# Patient Record
Sex: FEMALE | Race: WHITE | NOT HISPANIC OR LATINO | URBAN - METROPOLITAN AREA
[De-identification: names, ages, dates, MRNs, and addresses within clinical notes are randomized per-mention and may not be internally consistent; named-entity substitution may affect disease eponyms.]

---

## 2017-01-17 ENCOUNTER — EMERGENCY (EMERGENCY)
Facility: HOSPITAL | Age: 22
LOS: 1 days | Discharge: HOME | End: 2017-01-17
Attending: EMERGENCY MEDICINE | Admitting: EMERGENCY MEDICINE
Payer: COMMERCIAL

## 2017-01-17 VITALS
RESPIRATION RATE: 19 BRPM | OXYGEN SATURATION: 98 % | TEMPERATURE: 97 F | SYSTOLIC BLOOD PRESSURE: 124 MMHG | DIASTOLIC BLOOD PRESSURE: 56 MMHG | HEART RATE: 65 BPM

## 2017-01-17 PROBLEM — Z00.00 ENCOUNTER FOR PREVENTIVE HEALTH EXAMINATION: Status: ACTIVE | Noted: 2017-01-17

## 2017-01-17 LAB
BASOPHILS # BLD AUTO: 0.02 K/UL — SIGNIFICANT CHANGE UP (ref 0–0.2)
BASOPHILS NFR BLD AUTO: 0.1 % — SIGNIFICANT CHANGE UP (ref 0–2)
EOSINOPHIL # BLD AUTO: 0.04 K/UL — SIGNIFICANT CHANGE UP (ref 0–0.5)
EOSINOPHIL NFR BLD AUTO: 0.3 % — SIGNIFICANT CHANGE UP (ref 0–6)
HCT VFR BLD CALC: 36.1 % — SIGNIFICANT CHANGE UP (ref 34.5–45)
HGB BLD-MCNC: 11.9 G/DL — SIGNIFICANT CHANGE UP (ref 11.5–15.5)
IMM GRANULOCYTES NFR BLD AUTO: 0.3 % — SIGNIFICANT CHANGE UP (ref 0–1.5)
LYMPHOCYTES # BLD AUTO: 18.6 % — SIGNIFICANT CHANGE UP (ref 13–44)
LYMPHOCYTES # BLD AUTO: 2.56 K/UL — SIGNIFICANT CHANGE UP (ref 1–3.3)
MCHC RBC-ENTMCNC: 28.1 PG — SIGNIFICANT CHANGE UP (ref 27–34)
MCHC RBC-ENTMCNC: 33 % — SIGNIFICANT CHANGE UP (ref 32–36)
MCV RBC AUTO: 85.3 FL — SIGNIFICANT CHANGE UP (ref 80–100)
MONOCYTES # BLD AUTO: 0.47 K/UL — SIGNIFICANT CHANGE UP (ref 0–0.9)
MONOCYTES NFR BLD AUTO: 3.4 % — SIGNIFICANT CHANGE UP (ref 2–14)
NEUTROPHILS # BLD AUTO: 10.63 K/UL — HIGH (ref 1.8–7.4)
NEUTROPHILS NFR BLD AUTO: 77.3 % — HIGH (ref 43–77)
PLATELET # BLD AUTO: 342 K/UL — SIGNIFICANT CHANGE UP (ref 150–400)
PMV BLD: 9.7 FL — SIGNIFICANT CHANGE UP (ref 7–13)
RBC # BLD: 4.23 M/UL — SIGNIFICANT CHANGE UP (ref 3.8–5.2)
RBC # FLD: 12 % — SIGNIFICANT CHANGE UP (ref 10.3–14.5)
WBC # BLD: 13.76 K/UL — HIGH (ref 3.8–10.5)
WBC # FLD AUTO: 13.76 K/UL — HIGH (ref 3.8–10.5)

## 2017-01-17 PROCEDURE — 71010: CPT | Mod: 26

## 2017-01-17 PROCEDURE — 99234 HOSP IP/OBS SM DT SF/LOW 45: CPT

## 2017-01-17 PROCEDURE — 93010 ELECTROCARDIOGRAM REPORT: CPT | Mod: 59

## 2017-01-17 RX ORDER — SODIUM CHLORIDE 9 MG/ML
1000 INJECTION INTRAMUSCULAR; INTRAVENOUS; SUBCUTANEOUS ONCE
Qty: 0 | Refills: 0 | Status: COMPLETED | OUTPATIENT
Start: 2017-01-17 | End: 2017-01-17

## 2017-01-17 RX ADMIN — SODIUM CHLORIDE 2000 MILLILITER(S): 9 INJECTION INTRAMUSCULAR; INTRAVENOUS; SUBCUTANEOUS at 23:44

## 2017-01-17 NOTE — ED PROVIDER NOTE - MEDICAL DECISION MAKING DETAILS
Janis att: 22 yo woman presenting with syncope in the presence of chest pain. Janis att: 22 yo woman presenting with syncope in the presence of chest pain.  Chest pain concerning for possible arrythmia, EKG although not classic raises concern for HOCM.  LVH may be do to athletisism.  No e/o brugada, long or short QT, or accessory pathway.  No risk factors or findings for PE, PERC negative.  CDU for observation and echo.

## 2017-01-17 NOTE — ED ADULT TRIAGE NOTE - CHIEF COMPLAINT QUOTE
pt says she passed out at work an hour ago. 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 Has a small abrasion on her face. c/o head feels heavy now, denies any palpitation or CP or SOB or dizziness now.   PMH : Knee surgery few years ago . pt says she passed out at work an hour ago. 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 . Has a small abrasion on her face. c/o head feels heavy now, denies any palpitation or CP or SOB or dizziness now.      PMH : Knee surgery few years ago .

## 2017-01-17 NOTE — ED PROVIDER NOTE - OBJECTIVE STATEMENT
22 yo woman presenting with episode of passing out.  Was at work, felt abrupt onset of palpitations and chest pressure, nonradiaitng, nonexertional, onset at rest, not better or worse with anything.   Patient states she then felt dizzy and lost consciousness.  States she hit her head on the way down.  Reports no chest pain, dyspnea, or dizziness currently.  No family history of sudden death.

## 2017-01-17 NOTE — ED PROVIDER NOTE - PHYSICAL EXAMINATION
Janis att: General: Well appearing, nontoxic, no acute distress; Head: Normocephalic, stuble abrasion/bruise at left maxilla, maxilla and mandible nontender; Eyes: PERRL, EOMI; ENT: Airway patent; Neck: supple; Chest: Lungs clear to auscultation bilateral; Cardiac: Regular rate and rhythm, no murmurs, rubs or gallops; Abdomen: soft, nontender, nondistended; no guarding or rebound; Musculoskeletal: Calves symmetric, nontender, no palpable cord; Skin: No rash, normal skin tone; Neuro: Alert and Oriented to person, place, and time; No focal deficit, CN 2-12 symmetric and intact

## 2017-01-18 VITALS
HEART RATE: 72 BPM | RESPIRATION RATE: 16 BRPM | SYSTOLIC BLOOD PRESSURE: 105 MMHG | OXYGEN SATURATION: 100 % | DIASTOLIC BLOOD PRESSURE: 73 MMHG | TEMPERATURE: 97 F

## 2017-01-18 LAB
ALBUMIN SERPL ELPH-MCNC: 4.6 G/DL — SIGNIFICANT CHANGE UP (ref 3.3–5)
ALP SERPL-CCNC: 84 U/L — SIGNIFICANT CHANGE UP (ref 40–120)
ALT FLD-CCNC: 18 U/L — SIGNIFICANT CHANGE UP (ref 4–33)
APPEARANCE UR: CLEAR — SIGNIFICANT CHANGE UP
AST SERPL-CCNC: 21 U/L — SIGNIFICANT CHANGE UP (ref 4–32)
BACTERIA # UR AUTO: SIGNIFICANT CHANGE UP
BILIRUB SERPL-MCNC: 0.4 MG/DL — SIGNIFICANT CHANGE UP (ref 0.2–1.2)
BILIRUB UR-MCNC: NEGATIVE — SIGNIFICANT CHANGE UP
BLOOD UR QL VISUAL: NEGATIVE — SIGNIFICANT CHANGE UP
BUN SERPL-MCNC: 17 MG/DL — SIGNIFICANT CHANGE UP (ref 7–23)
CALCIUM SERPL-MCNC: 9.6 MG/DL — SIGNIFICANT CHANGE UP (ref 8.4–10.5)
CHLORIDE SERPL-SCNC: 101 MMOL/L — SIGNIFICANT CHANGE UP (ref 98–107)
CK MB BLD-MCNC: 1 NG/ML — SIGNIFICANT CHANGE UP (ref 1–4.7)
CK MB BLD-MCNC: SIGNIFICANT CHANGE UP (ref 0–2.5)
CK SERPL-CCNC: 35 U/L — SIGNIFICANT CHANGE UP (ref 25–170)
CO2 SERPL-SCNC: 24 MMOL/L — SIGNIFICANT CHANGE UP (ref 22–31)
COLOR SPEC: SIGNIFICANT CHANGE UP
CREAT SERPL-MCNC: 0.67 MG/DL — SIGNIFICANT CHANGE UP (ref 0.5–1.3)
GLUCOSE SERPL-MCNC: 78 MG/DL — SIGNIFICANT CHANGE UP (ref 70–99)
GLUCOSE UR-MCNC: NEGATIVE — SIGNIFICANT CHANGE UP
HBA1C BLD-MCNC: 5.4 % — SIGNIFICANT CHANGE UP (ref 4–5.6)
HCG SERPL-ACNC: < 5 MIU/ML — SIGNIFICANT CHANGE UP
HCT VFR BLD CALC: 33.2 % — LOW (ref 34.5–45)
HGB BLD-MCNC: 10.9 G/DL — LOW (ref 11.5–15.5)
KETONES UR-MCNC: NEGATIVE — SIGNIFICANT CHANGE UP
LEUKOCYTE ESTERASE UR-ACNC: NEGATIVE — SIGNIFICANT CHANGE UP
MCHC RBC-ENTMCNC: 28.2 PG — SIGNIFICANT CHANGE UP (ref 27–34)
MCHC RBC-ENTMCNC: 32.8 % — SIGNIFICANT CHANGE UP (ref 32–36)
MCV RBC AUTO: 85.8 FL — SIGNIFICANT CHANGE UP (ref 80–100)
MUCOUS THREADS # UR AUTO: SIGNIFICANT CHANGE UP
NITRITE UR-MCNC: NEGATIVE — SIGNIFICANT CHANGE UP
PH UR: 6 — SIGNIFICANT CHANGE UP (ref 4.6–8)
PLATELET # BLD AUTO: 302 K/UL — SIGNIFICANT CHANGE UP (ref 150–400)
PMV BLD: 9.8 FL — SIGNIFICANT CHANGE UP (ref 7–13)
POTASSIUM SERPL-MCNC: 4.1 MMOL/L — SIGNIFICANT CHANGE UP (ref 3.5–5.3)
POTASSIUM SERPL-SCNC: 4.1 MMOL/L — SIGNIFICANT CHANGE UP (ref 3.5–5.3)
PROT SERPL-MCNC: 7.1 G/DL — SIGNIFICANT CHANGE UP (ref 6–8.3)
PROT UR-MCNC: NEGATIVE — SIGNIFICANT CHANGE UP
RBC # BLD: 3.87 M/UL — SIGNIFICANT CHANGE UP (ref 3.8–5.2)
RBC # FLD: 12 % — SIGNIFICANT CHANGE UP (ref 10.3–14.5)
RBC CASTS # UR COMP ASSIST: SIGNIFICANT CHANGE UP (ref 0–?)
SODIUM SERPL-SCNC: 138 MMOL/L — SIGNIFICANT CHANGE UP (ref 135–145)
SP GR SPEC: 1.01 — SIGNIFICANT CHANGE UP (ref 1–1.03)
SQUAMOUS # UR AUTO: SIGNIFICANT CHANGE UP
TROPONIN T SERPL-MCNC: < 0.06 NG/ML — SIGNIFICANT CHANGE UP (ref 0–0.06)
TROPONIN T SERPL-MCNC: < 0.06 NG/ML — SIGNIFICANT CHANGE UP (ref 0–0.06)
UROBILINOGEN FLD QL: NORMAL E.U. — SIGNIFICANT CHANGE UP (ref 0.1–0.2)
WBC # BLD: 10.59 K/UL — HIGH (ref 3.8–10.5)
WBC # FLD AUTO: 10.59 K/UL — HIGH (ref 3.8–10.5)
WBC CLUMPS #/AREA URNS HPF: PRESENT — HIGH (ref 0–?)
WBC UR QL: SIGNIFICANT CHANGE UP (ref 0–?)

## 2017-01-18 PROCEDURE — 93306 TTE W/DOPPLER COMPLETE: CPT | Mod: 26

## 2017-01-18 PROCEDURE — 70450 CT HEAD/BRAIN W/O DYE: CPT | Mod: 26

## 2017-01-18 RX ORDER — SODIUM CHLORIDE 9 MG/ML
1000 INJECTION INTRAMUSCULAR; INTRAVENOUS; SUBCUTANEOUS ONCE
Qty: 0 | Refills: 0 | Status: COMPLETED | OUTPATIENT
Start: 2017-01-18 | End: 2017-01-18

## 2017-01-18 RX ADMIN — SODIUM CHLORIDE 1000 MILLILITER(S): 9 INJECTION INTRAMUSCULAR; INTRAVENOUS; SUBCUTANEOUS at 02:57

## 2017-01-18 NOTE — ED CDU PROVIDER NOTE - OBJECTIVE STATEMENT
22 yo F with no pmhx here with complaint of syncope. Patient came to ED yesterday after having syncopal event at work. 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  She went to sit down on the floor and syncopized for less than 1 min as per office staff. Patient states she struck her L cheek on the cabinet when she sat on the floor and sustained a small abrasion.  Patient states she remembers the event. Otherwise has been feeling well. Ate and drank earlier in the day. Three years ago patient states she had a similar incident in which she was walking from using the bathroom and "passed out" she was brought to an ER for eval and was told it was because her BP dropped. No other incidents of the same. denies fhx of cardiac issues. Mom reports when she was younger she passed out as well. denies personal hx of cardiac issues or murmurs or ekg abnormalities. Denies recent fever, HA, sob, CP, abdominal pain, difficulty breathing, vision changes.

## 2017-01-18 NOTE — ED CDU PROVIDER NOTE - NEUROLOGICAL, MLM
Alert and oriented, no focal deficits, no motor or sensory deficits. EOMi, point to point intact, visual fields intact, strength 5/5 UE and LE bilaterally.

## 2017-01-18 NOTE — ED CDU PROVIDER NOTE - PHYSICAL EXAMINATION
SKIN: L cheek with superficial 1.5 cm abrasion, no evidence of infection, no discharge/drainage/bleeding, non tender, no repairable laceration SKIN: L cheek with superficial 1.5 cm abrasion, no evidence of infection, no discharge/drainage/bleeding, non tender, no repairable laceration  Fink att: General: Well appearing, nontoxic, no acute distress; Head: Normocephalic abrasion left maxilla; Eyes: PERRL, EOMI; ENT: Airway patent; Neck: supple; Chest: Lungs clear to auscultation bilateral; Cardiac: Regular rate and rhythm, no murmurs, rubs or gallops; Abdomen: soft, nontender, nondistended; no guarding or rebound; Musculoskeletal: Calves symmetric, nontender, no palpable cord; Skin: No rash, normal skin tone; Neuro: Alert and Oriented to person, place, and time; No focal deficit, CN 2-12 symmetric and intact

## 2017-01-18 NOTE — ED ADULT NURSE NOTE - CHIEF COMPLAINT QUOTE
pt says she passed out at work an hour ago. 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 . Has a small abrasion on her face. c/o head feels heavy now, denies any palpitation or CP or SOB or dizziness now.      PMH : Knee surgery few years ago .

## 2017-01-18 NOTE — ED CDU PROVIDER NOTE - ATTENDING CONTRIBUTION TO CARE
I performed a face to face evaluation of this patient and obtained a history and performed a full exam.  I agree with the history, physical exam and plan of the PA.

## 2017-01-18 NOTE — ED CDU PROVIDER NOTE - PLAN OF CARE
Rest, drink plenty of fluids.  Advance activity as tolerated.  Continue all previously prescribed medications as directed.  Follow up with your primary physician Dr. Jeniffer Acosta and cardiology (referral list provided) in 48-72 hours- bring copies of your results.  Return to the ER for worsening or persistent symptoms, including worsening/persistent chest pain, shortness of breath, passing out, ANY NEW OR CONCERNING SYMPTOMS. If you have issues obtaining follow up, please call: [phone] to obtain a doctor or specialist who takes your insurance in your area.

## 2017-01-18 NOTE — ED CDU PROVIDER NOTE - MEDICAL DECISION MAKING DETAILS
22 yo F with no pmhx here with syncopal event. Pt seen in ED with normal exam, found to have EKG with LVH and q waves, concern for HOCM. Denies fhx of sudden death or heart disease. Otherwise well.  CT negative, CE x 1 neg, second pending, CXR wnl.  PLAN: CE, Labs, Echo in AM, Tele monitor 20 yo F with no pmhx here with syncopal event. Pt seen in ED with normal exam, found to have EKG with LVH and q waves, concern for HOCM. Denies fhx of sudden death or heart disease. Otherwise well.  CT negative, CE x 1 neg, second pending, CXR wnl.  PLAN: CE, Labs, Echo in AM, Tele monitor  Fink att: 20 yo with syncope.  CDU for cardiac monitor and echo.

## 2017-01-18 NOTE — ED CDU PROVIDER NOTE - PROGRESS NOTE DETAILS
CDU TREVOR Cantu Progress Note:  Pt endorses feeling well.  Pt does not have any acute complaints.  Awaiting Echo results.  Discussed case with pt's pediatrician Dr. Jeniffer Acosta who agrees with outpatient follow up in office and referral to cardiology for possible further testing if no acute pathology noted on echocardiogram.  Awaiting echocardiogram results at this time. CDU TREVOR Cantu Discharge Note:  Pt endorses feeling well without any acute complaints.  Echo with EF 65% and minimal aortic regurgitation.  Pt medically stable for discharge.  Pt to follow up with Dr. Acosta (pediatrician) and cardiology (referral list provided). CDU Attending Dr. Rowe Discharge Note: I took over care of this patient at 7 AM on 1/18/19.  The patient is a 20 yo female sent to CDU for echo due to syncopal episode while working.  No CP/SOB, N/V/D or abdominal pain.  On my evaluation this morning pt noted feeling improved.  On exam pt well appearing, in NAD, heart RRR, lungs CTAB, abd NTND, extremities without swelling, strength 5/5 in all extremities and skin without rash.  Echo showing no acute findings and pt is stable for discharge home with outpatient follow up.

## 2021-07-06 ENCOUNTER — OUTPATIENT (OUTPATIENT)
Dept: OUTPATIENT SERVICES | Facility: HOSPITAL | Age: 26
LOS: 1 days | End: 2021-07-06

## 2021-07-06 VITALS
SYSTOLIC BLOOD PRESSURE: 98 MMHG | HEART RATE: 73 BPM | DIASTOLIC BLOOD PRESSURE: 60 MMHG | HEIGHT: 69.5 IN | WEIGHT: 156.09 LBS | TEMPERATURE: 98 F | OXYGEN SATURATION: 99 % | RESPIRATION RATE: 16 BRPM

## 2021-07-06 DIAGNOSIS — M26.12 OTHER JAW ASYMMETRY: ICD-10-CM

## 2021-07-06 DIAGNOSIS — Z98.890 OTHER SPECIFIED POSTPROCEDURAL STATES: Chronic | ICD-10-CM

## 2021-07-06 DIAGNOSIS — M26.03 MANDIBULAR HYPERPLASIA: ICD-10-CM

## 2021-07-06 DIAGNOSIS — Z01.812 ENCOUNTER FOR PREPROCEDURAL LABORATORY EXAMINATION: ICD-10-CM

## 2021-07-06 LAB
BLD GP AB SCN SERPL QL: NEGATIVE — SIGNIFICANT CHANGE UP
HCG UR QL: NEGATIVE — SIGNIFICANT CHANGE UP
HCT VFR BLD CALC: 35.5 % — SIGNIFICANT CHANGE UP (ref 34.5–45)
HGB BLD-MCNC: 11.5 G/DL — SIGNIFICANT CHANGE UP (ref 11.5–15.5)
MCHC RBC-ENTMCNC: 27.7 PG — SIGNIFICANT CHANGE UP (ref 27–34)
MCHC RBC-ENTMCNC: 32.4 GM/DL — SIGNIFICANT CHANGE UP (ref 32–36)
MCV RBC AUTO: 85.5 FL — SIGNIFICANT CHANGE UP (ref 80–100)
NRBC # BLD: 0 /100 WBCS — SIGNIFICANT CHANGE UP
NRBC # FLD: 0 K/UL — SIGNIFICANT CHANGE UP
PLATELET # BLD AUTO: 391 K/UL — SIGNIFICANT CHANGE UP (ref 150–400)
RBC # BLD: 4.15 M/UL — SIGNIFICANT CHANGE UP (ref 3.8–5.2)
RBC # FLD: 12.7 % — SIGNIFICANT CHANGE UP (ref 10.3–14.5)
RH IG SCN BLD-IMP: POSITIVE — SIGNIFICANT CHANGE UP
WBC # BLD: 5.71 K/UL — SIGNIFICANT CHANGE UP (ref 3.8–10.5)
WBC # FLD AUTO: 5.71 K/UL — SIGNIFICANT CHANGE UP (ref 3.8–10.5)

## 2021-07-06 NOTE — H&P PST ADULT - ENMT COMMENTS
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

## 2021-07-06 NOTE — H&P PST ADULT - HISTORY OF PRESENT ILLNESS
26y/o female presents for preop eval for scheduled maxillary lefort I osteotomy with bone graft.  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

## 2021-07-06 NOTE — H&P PST ADULT - NSICDXFAMILYHX_GEN_ALL_CORE_FT
FAMILY HISTORY:  No pertinent family history in first degree relatives     FAMILY HISTORY:  Family hx of hypertension    Grandparent  Still living? Yes, Estimated age: Age Unknown  FHx: heart disease, Age at diagnosis: Age Unknown

## 2021-07-06 NOTE — H&P PST ADULT - NSICDXPROBLEM_GEN_ALL_CORE_FT
PROBLEM DIAGNOSES  Problem: Encounter for preprocedure screening laboratory testing for COVID-19  Assessment and Plan: completed covid vaccine 3/18/21, pt instructed to bring hard copy, stated, copy was email to surgeon office.  Pt with recent covid exposure on 7/4/21 at a graduation party  Instructed to con    Problem: Other jaw asymmetry  Assessment and Plan: Written & verbal preop instructions, gi prophylaxis   Pt verbalized good understanding.   ucg, abo on admit        PROBLEM DIAGNOSES  Problem: Encounter for preprocedure screening laboratory testing for COVID-19  Assessment and Plan: completed covid vaccine 3/18/21, pt instructed to bring hard copy, stated, copy was email to surgeon office.  Pt with recent covid exposure on 7/4/21 at a graduation party  Instructed to contact  Dr Ac office for further instructions.    Problem: Other jaw asymmetry  Assessment and Plan: Written & verbal preop instructions, gi prophylaxis   Pt verbalized good understanding.   ucg, abo on admit

## 2021-07-06 NOTE — H&P PST ADULT - PRIMARY CARE PROVIDER
Pt AAOx3, resting in bed, side rails up x 2, call bell within reach. NAD at this time. Will continue to monitor.     Dr Zuleima Brown  [phone]

## 2021-07-06 NOTE — H&P PST ADULT - WEIGHT IN KG
September 11, 2017        Michael Valerio MD  [address] - [facility]  [address]  [facility] [address]  Phone: [phone]  Fax: [phone]   Patient: Pablo Zamudio   MR Number: [id number]   YOB: 1996   Date of Visit: 9/11/2017       Dear Dr. Valerio:    Thank you for referring Pablo Zamudio to me for evaluation. Attached you will find relevant portions of my assessment and plan of care.    If you have questions, please do not hesitate to call me. I look forward to following Pablo Zamudio along with you.    Sincerely,      French Ballesteros MD            CC  No Recipients    Enclosure         
70.8

## 2021-07-12 ENCOUNTER — TRANSCRIPTION ENCOUNTER (OUTPATIENT)
Age: 26
End: 2021-07-12

## 2021-07-13 ENCOUNTER — INPATIENT (INPATIENT)
Facility: HOSPITAL | Age: 26
LOS: 0 days | Discharge: HOME | End: 2021-07-14
Attending: ORAL & MAXILLOFACIAL SURGERY | Admitting: ORAL & MAXILLOFACIAL SURGERY

## 2021-07-13 VITALS
HEIGHT: 69.5 IN | OXYGEN SATURATION: 98 % | RESPIRATION RATE: 18 BRPM | DIASTOLIC BLOOD PRESSURE: 60 MMHG | TEMPERATURE: 98 F | WEIGHT: 156.09 LBS | HEART RATE: 65 BPM | SYSTOLIC BLOOD PRESSURE: 105 MMHG

## 2021-07-13 DIAGNOSIS — Z98.890 OTHER SPECIFIED POSTPROCEDURAL STATES: Chronic | ICD-10-CM

## 2021-07-13 DIAGNOSIS — M26.03 MANDIBULAR HYPERPLASIA: ICD-10-CM

## 2021-07-13 LAB — HCG UR QL: NEGATIVE — SIGNIFICANT CHANGE UP

## 2021-07-13 RX ORDER — HYDROMORPHONE HYDROCHLORIDE 2 MG/ML
0.5 INJECTION INTRAMUSCULAR; INTRAVENOUS; SUBCUTANEOUS
Refills: 0 | Status: DISCONTINUED | OUTPATIENT
Start: 2021-07-13 | End: 2021-07-13

## 2021-07-13 RX ORDER — FLUTICASONE PROPIONATE 50 MCG
2 SPRAY, SUSPENSION NASAL ONCE
Refills: 0 | Status: COMPLETED | OUTPATIENT
Start: 2021-07-13 | End: 2021-07-13

## 2021-07-13 RX ORDER — OXYCODONE HYDROCHLORIDE 5 MG/1
5 TABLET ORAL ONCE
Refills: 0 | Status: DISCONTINUED | OUTPATIENT
Start: 2021-07-13 | End: 2021-07-13

## 2021-07-13 RX ORDER — ONDANSETRON 8 MG/1
4 TABLET, FILM COATED ORAL EVERY 8 HOURS
Refills: 0 | Status: DISCONTINUED | OUTPATIENT
Start: 2021-07-13 | End: 2021-07-14

## 2021-07-13 RX ORDER — PENICILLIN G POTASSIUM 5000000 [IU]/1
2 POWDER, FOR SOLUTION INTRAMUSCULAR; INTRAPLEURAL; INTRATHECAL; INTRAVENOUS EVERY 4 HOURS
Refills: 0 | Status: DISCONTINUED | OUTPATIENT
Start: 2021-07-13 | End: 2021-07-14

## 2021-07-13 RX ORDER — CHLORHEXIDINE GLUCONATE 213 G/1000ML
15 SOLUTION TOPICAL
Refills: 0 | Status: DISCONTINUED | OUTPATIENT
Start: 2021-07-13 | End: 2021-07-14

## 2021-07-13 RX ORDER — OXYMETAZOLINE HYDROCHLORIDE 0.5 MG/ML
2 SPRAY NASAL
Refills: 0 | Status: DISCONTINUED | OUTPATIENT
Start: 2021-07-13 | End: 2021-07-14

## 2021-07-13 RX ORDER — SODIUM CHLORIDE 0.65 %
2 AEROSOL, SPRAY (ML) NASAL
Refills: 0 | Status: DISCONTINUED | OUTPATIENT
Start: 2021-07-13 | End: 2021-07-14

## 2021-07-13 RX ORDER — IBUPROFEN 200 MG
600 TABLET ORAL EVERY 6 HOURS
Refills: 0 | Status: DISCONTINUED | OUTPATIENT
Start: 2021-07-13 | End: 2021-07-14

## 2021-07-13 RX ORDER — METOCLOPRAMIDE HCL 10 MG
10 TABLET ORAL ONCE
Refills: 0 | Status: COMPLETED | OUTPATIENT
Start: 2021-07-13 | End: 2021-07-13

## 2021-07-13 RX ORDER — OXYCODONE HYDROCHLORIDE 5 MG/1
10 TABLET ORAL EVERY 4 HOURS
Refills: 0 | Status: DISCONTINUED | OUTPATIENT
Start: 2021-07-13 | End: 2021-07-14

## 2021-07-13 RX ORDER — ACETAMINOPHEN 500 MG
650 TABLET ORAL EVERY 6 HOURS
Refills: 0 | Status: DISCONTINUED | OUTPATIENT
Start: 2021-07-13 | End: 2021-07-14

## 2021-07-13 RX ORDER — SODIUM CHLORIDE 9 MG/ML
1000 INJECTION, SOLUTION INTRAVENOUS
Refills: 0 | Status: DISCONTINUED | OUTPATIENT
Start: 2021-07-13 | End: 2021-07-14

## 2021-07-13 RX ORDER — MORPHINE SULFATE 50 MG/1
2 CAPSULE, EXTENDED RELEASE ORAL ONCE
Refills: 0 | Status: DISCONTINUED | OUTPATIENT
Start: 2021-07-13 | End: 2021-07-14

## 2021-07-13 RX ORDER — OXYCODONE HYDROCHLORIDE 5 MG/1
5 TABLET ORAL EVERY 4 HOURS
Refills: 0 | Status: DISCONTINUED | OUTPATIENT
Start: 2021-07-13 | End: 2021-07-14

## 2021-07-13 RX ORDER — HYDROMORPHONE HYDROCHLORIDE 2 MG/ML
0.25 INJECTION INTRAMUSCULAR; INTRAVENOUS; SUBCUTANEOUS
Refills: 0 | Status: DISCONTINUED | OUTPATIENT
Start: 2021-07-13 | End: 2021-07-13

## 2021-07-13 RX ORDER — ONDANSETRON 8 MG/1
4 TABLET, FILM COATED ORAL ONCE
Refills: 0 | Status: COMPLETED | OUTPATIENT
Start: 2021-07-13 | End: 2021-07-13

## 2021-07-13 RX ADMIN — Medication 10 MILLIGRAM(S): at 14:19

## 2021-07-13 RX ADMIN — HYDROMORPHONE HYDROCHLORIDE 0.5 MILLIGRAM(S): 2 INJECTION INTRAMUSCULAR; INTRAVENOUS; SUBCUTANEOUS at 14:05

## 2021-07-13 RX ADMIN — Medication 600 MILLIGRAM(S): at 18:15

## 2021-07-13 RX ADMIN — PENICILLIN G POTASSIUM 100 MILLION UNIT(S): 5000000 POWDER, FOR SOLUTION INTRAMUSCULAR; INTRAPLEURAL; INTRATHECAL; INTRAVENOUS at 14:04

## 2021-07-13 RX ADMIN — Medication 650 MILLIGRAM(S): at 18:59

## 2021-07-13 RX ADMIN — Medication 650 MILLIGRAM(S): at 18:24

## 2021-07-13 RX ADMIN — ONDANSETRON 4 MILLIGRAM(S): 8 TABLET, FILM COATED ORAL at 18:04

## 2021-07-13 RX ADMIN — Medication 10 MILLIGRAM(S): at 21:26

## 2021-07-13 RX ADMIN — PENICILLIN G POTASSIUM 100 MILLION UNIT(S): 5000000 POWDER, FOR SOLUTION INTRAMUSCULAR; INTRAPLEURAL; INTRATHECAL; INTRAVENOUS at 18:04

## 2021-07-13 RX ADMIN — Medication 600 MILLIGRAM(S): at 18:45

## 2021-07-13 RX ADMIN — PENICILLIN G POTASSIUM 100 MILLION UNIT(S): 5000000 POWDER, FOR SOLUTION INTRAMUSCULAR; INTRAPLEURAL; INTRATHECAL; INTRAVENOUS at 22:39

## 2021-07-13 RX ADMIN — CHLORHEXIDINE GLUCONATE 15 MILLILITER(S): 213 SOLUTION TOPICAL at 18:24

## 2021-07-13 RX ADMIN — Medication 2 SPRAY(S): at 16:28

## 2021-07-13 RX ADMIN — HYDROMORPHONE HYDROCHLORIDE 0.5 MILLIGRAM(S): 2 INJECTION INTRAMUSCULAR; INTRAVENOUS; SUBCUTANEOUS at 13:51

## 2021-07-13 NOTE — H&P ADULT - NSICDXFAMILYHX_GEN_ALL_CORE_FT
FAMILY HISTORY:  Family hx of hypertension    Grandparent  Still living? Yes, Estimated age: Age Unknown  FHx: heart disease, Age at diagnosis: Age Unknown

## 2021-07-13 NOTE — H&P ADULT - HISTORY OF PRESENT ILLNESS
24 y/o female presents for scheduled maxillary lefort I osteotomy, bilateral sagittal split osteotomy with bone graft.  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

## 2021-07-13 NOTE — H&P ADULT - ASSESSMENT
24 y/o female presents for scheduled maxillary lefort I osteotomy, bilateral sagittal split osteotomy with bone graft with Dr. Ac in OR.  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

## 2021-07-13 NOTE — H&P ADULT - NSHPPHYSICALEXAM_GEN_ALL_CORE
· Constitutional	no distress  · Eyes	PERRL; EOMI; conjunctiva clear; pupil L; pupil R  · Pupil L	round; brisk  · Pupil Size L	mm, 3  · Pupil R	round; brisk  · Pupil Size R	mm, 3  · ENMT	mouth- moist; pharynx-no redness, no discharge   · Neck	supple; no JVD  · Breasts	not examined  · Back	normal shape; ROM intact  · Respiratory	breath sounds equal; respirations non-labored; clear to auscultation bilaterally  · Cardiovascular	regular rate and rhythm  normal PMI. positive S1; positive S2  · Gastrointestinal	GI Normal-soft; nontender; no distention; no masses palpable; bowel sounds normal  · Genitourinary	not examined  · Rectal	not examined  · Extremities	no clubbing; no cyanosis; no pedal edema  · Vascular	      Carotid Pulse- right normal; left normal. Radial Pulse-- right normal  · Neurological	alert and oriented x 3  · Skin	warm and dry; color normal  · Skin Comments	mobile mass - left wrist  · Lymph Nodes	no lymphadenopathy noted  · Musculoskeletal	Preop dx other jaw asymmetry  · Psychiatric	normal affect; normal behavior

## 2021-07-13 NOTE — H&P ADULT - NSHPREVIEWOFSYSTEMS_GEN_ALL_CORE
· Negative General Symptoms	no fever; no chills; no sweating  · Negative Skin Symptoms	no rash; no itching; no dryness  · Negative Ophthalmologic Symptoms	no diplopia; no photophobia; no blurred vision L; no blurred vision R  · ENMT Symptoms	sinus symptoms  nasal congestion  · ENMT Comments	TMJ- asymmetry of jaw with clicking of jaw  · Negative Respiratory and Thorax Symptoms	no wheezing; no dyspnea; no cough; no hemoptysis; no pleuritic chest pain  · Respiratory and Thorax Comments	recently completed 5 day z yemi  · Negative Cardiovascular Symptoms	no chest pain; no palpitations; no dyspnea on exertion; no orthopnea; no paroxysmal nocturnal dyspnea; no peripheral edema; no claudication  · Negative Gastrointestinal Symptoms	no nausea; no vomiting; no diarrhea; no constipation; no abdominal pain  · Negative General Genitourinary Symptoms	no hematuria; no renal colic; no flank pain L; no flank pain R; no bladder infections; no dysuria; normal urinary frequency  · Musculoskeletal Symptoms	joint pain; left wrist ganglion cyst  · Negative Neurological Symptoms	no syncope; no tremors; no vertigo; no difficulty walking; no headache  · Negative Psychiatric Symptoms	no depression; no anxiety; no insomnia  · Negative Hematology Symptoms	no gum bleeding; no nose bleeding; no skin lumps  · Negative Endocrine Symptoms	no cold intolerance; no heat intolerance  · Allergic/Immunologic	negative

## 2021-07-13 NOTE — PROGRESS NOTE ADULT - SUBJECTIVE AND OBJECTIVE BOX
OMFS POST-OP NOTE  #[id number]     25y female 5 hours s/p Lefort 1 Osteotomy and BSSO. Patient examined at PACU bed. ROSA ELENA since OR. Patient recovering uneventfully. Vitals WNR. Afebrile. A-Line out, NGT out. Voiding into goldberg until pt ready for transfer to floor. Tolerating sips PO water. IV fluids on. Pending ambulation and pending independent voiding. Pain score 3/10. Patient denies fever, chills, vomiting. Reports mild to moderate nausea.       Objective:  Gen: NAD, AAOx3  Pulm: Non-labored breathing   EOE: B/L mild facial edema c/w surgery. Jaw bra in place, Ice in place, Dried heme at nares b/l. Mild b/l V-2 and V-3 hypoesthesia.   IOE: Elastics in place, holding occlusion stable. Sutures clean and intact. Incisions hemostatic. Gingiva pink, and well-perfused.     OBJECTIVE:   T(C): 36.5 (07-13-21 @ 16:30), Max: 36.5 (07-13-21 @ 16:30)  HR: 75 (07-13-21 @ 18:00) (59 - 95)  BP: 122/75 (07-13-21 @ 18:00) (105/60 - 128/62)  RR: 16 (07-13-21 @ 18:00) (12 - 20)  SpO2: 99% (07-13-21 @ 18:00) (96% - 100%)  Wt(kg): --  CAPILLARY BLOOD GLUCOSE        I&O's Detail    13 Jul 2021 07:01  -  13 Jul 2021 18:22  --------------------------------------------------------  IN:    Lactated Ringers: 400 mL    Oral Fluid: 50 mL  Total IN: 450 mL    OUT:    Indwelling Catheter - Urethral (mL): 400 mL    Nasogastric/Oral tube (mL): 150 mL  Total OUT: 550 mL    Total NET: -100 mL

## 2021-07-14 ENCOUNTER — TRANSCRIPTION ENCOUNTER (OUTPATIENT)
Age: 26
End: 2021-07-14

## 2021-07-14 VITALS
RESPIRATION RATE: 17 BRPM | OXYGEN SATURATION: 99 % | SYSTOLIC BLOOD PRESSURE: 117 MMHG | DIASTOLIC BLOOD PRESSURE: 59 MMHG | TEMPERATURE: 99 F | HEART RATE: 77 BPM

## 2021-07-14 RX ORDER — OXYCODONE HYDROCHLORIDE 5 MG/1
5 TABLET ORAL
Qty: 0 | Refills: 0 | DISCHARGE
Start: 2021-07-14

## 2021-07-14 RX ORDER — ACETAMINOPHEN 500 MG
20.31 TABLET ORAL
Qty: 0 | Refills: 0 | DISCHARGE
Start: 2021-07-14

## 2021-07-14 RX ORDER — OXYMETAZOLINE HYDROCHLORIDE 0.5 MG/ML
0 SPRAY NASAL
Qty: 0 | Refills: 0 | DISCHARGE
Start: 2021-07-14 | End: 2021-07-16

## 2021-07-14 RX ORDER — AMOXICILLIN 250 MG/5ML
5 SUSPENSION, RECONSTITUTED, ORAL (ML) ORAL
Qty: 0 | Refills: 0 | DISCHARGE

## 2021-07-14 RX ORDER — SODIUM CHLORIDE 0.65 %
0 AEROSOL, SPRAY (ML) NASAL
Qty: 0 | Refills: 0 | DISCHARGE
Start: 2021-07-14

## 2021-07-14 RX ORDER — IBUPROFEN 200 MG
30 TABLET ORAL
Qty: 0 | Refills: 0 | DISCHARGE
Start: 2021-07-14

## 2021-07-14 RX ORDER — CHLORHEXIDINE GLUCONATE 213 G/1000ML
0 SOLUTION TOPICAL
Qty: 0 | Refills: 0 | DISCHARGE
Start: 2021-07-14

## 2021-07-14 RX ADMIN — OXYCODONE HYDROCHLORIDE 5 MILLIGRAM(S): 5 TABLET ORAL at 04:46

## 2021-07-14 RX ADMIN — PENICILLIN G POTASSIUM 100 MILLION UNIT(S): 5000000 POWDER, FOR SOLUTION INTRAMUSCULAR; INTRAPLEURAL; INTRATHECAL; INTRAVENOUS at 01:59

## 2021-07-14 RX ADMIN — CHLORHEXIDINE GLUCONATE 15 MILLILITER(S): 213 SOLUTION TOPICAL at 07:10

## 2021-07-14 RX ADMIN — OXYCODONE HYDROCHLORIDE 5 MILLIGRAM(S): 5 TABLET ORAL at 09:38

## 2021-07-14 RX ADMIN — OXYCODONE HYDROCHLORIDE 5 MILLIGRAM(S): 5 TABLET ORAL at 09:08

## 2021-07-14 RX ADMIN — PENICILLIN G POTASSIUM 100 MILLION UNIT(S): 5000000 POWDER, FOR SOLUTION INTRAMUSCULAR; INTRAPLEURAL; INTRATHECAL; INTRAVENOUS at 06:46

## 2021-07-14 RX ADMIN — Medication 600 MILLIGRAM(S): at 02:02

## 2021-07-14 RX ADMIN — Medication 600 MILLIGRAM(S): at 05:07

## 2021-07-14 RX ADMIN — OXYCODONE HYDROCHLORIDE 5 MILLIGRAM(S): 5 TABLET ORAL at 05:07

## 2021-07-14 RX ADMIN — OXYMETAZOLINE HYDROCHLORIDE 2 SPRAY(S): 0.5 SPRAY NASAL at 07:10

## 2021-07-14 NOTE — PROGRESS NOTE ADULT - ASSESSMENT
A/p:  25y female 5 hours s/p Lefort 1 Osteotomy and BSSO.   - ROSA ELENA since OR, recovering well from procedure post operatively.   - C/w IV Abx   - C/w Pain control (Ibuprofen/Tylenol around the clock, Oxy prn*)   - Encourage PO clear liquid diet  - Encourage voiding and ambulation as tolerated.   - Sinus Precautions*  - HOB 30 degrees and replace ice packs often as needed.   - Monitor i/o's, Vitals q4h
A/p:  25y female s/p Lefort 1 Osteotomy and BSSO. Recovering well from procedure post operatively. Pt hemodynamically stable.   - C/w IV Abx   - C/w Pain control (Ibuprofen/Tylenol around the clock, Oxy prn*)   - Encourage PO full liquid diet  - Encourage voiding and ambulation as tolerated.   - Sinus Precautions*  - HOB 30 degrees and replace ice packs often as needed.   - Monitor i/o's, Vitals q4h  -Anticipate discharge afternoon pending further PO intake, ambulation, voiding.

## 2021-07-14 NOTE — DISCHARGE NOTE NURSING/CASE MANAGEMENT/SOCIAL WORK - NSDCPNINST_GEN_ALL_CORE
Please seek immediate medical attention if noted with any severe pain, excessive bleeding, or difficulty breathing. Please keep all follow up appointments.

## 2021-07-14 NOTE — PROGRESS NOTE ADULT - SUBJECTIVE AND OBJECTIVE BOX
[facility] PROGRESS NOTE  #[id number]     25y female Lefort 1 Osteotomy and BSSO.   HD02, POD01     24 hr events: No acute events overnight     Subjective: Patient examined bedside. Patient recovering uneventfully. Tolerating sips PO water. ambulating, voiding. Pain score 3/10. Patient denies fever, chills, vomiting. Reports improvement in nausea since overnight.     Objective:  Gen: NAD, AAOx3  Pulm: Non-labored breathing   EOE: B/L mild facial edema c/w surgery. Jaw bra in place, Ice in place. Improvement in mild b/l V-2 and V-3 hypoesthesia since 5 hr post op check.   IOE: Elastics and IMF screws in place, holding occlusion stable. Sutures clean and intact. Incisions hemostatic. Gingiva pink, and well-perfused.     OBJECTIVE:   Vital Signs Last 24 Hrs  T(C): 36.4 (14 Jul 2021 06:50), Max: 36.8 (13 Jul 2021 21:46)  T(F): 97.6 (14 Jul 2021 06:50), Max: 98.2 (13 Jul 2021 21:46)  HR: 82 (14 Jul 2021 06:50) (59 - 95)  BP: 118/63 (14 Jul 2021 06:50) (105/60 - 132/68)  BP(mean): 86 (13 Jul 2021 20:00) (71 - 86)  RR: 17 (14 Jul 2021 06:50) (12 - 20)  SpO2: 98% (14 Jul 2021 06:50) (96% - 100%)       I&O's Summary    13 Jul 2021 07:01  -  14 Jul 2021 07:00  --------------------------------------------------------  IN: 2310 mL / OUT: 2150 mL / NET: 160 mL

## 2021-07-14 NOTE — DISCHARGE NOTE NURSING/CASE MANAGEMENT/SOCIAL WORK - PATIENT PORTAL LINK FT
You can access the FollowMyHealth Patient Portal offered by [facility] by registering at the following website: [email or web address]. By joining [facility]’s FollowMyHealth portal, you will also be able to view your health information using other applications (apps) compatible with our system.

## 2021-07-14 NOTE — DISCHARGE NOTE PROVIDER - NSDCMRMEDTOKEN_GEN_ALL_CORE_FT
acetaminophen 160 mg/5 mL oral suspension: 20.31 milliliter(s) orally every 6 hours  amoxicillin 400 mg/5 mL oral liquid: 5 milliliter(s) orally every 12 hours  chlorhexidine 0.12% mucous membrane liquid:  mucous membrane   ibuprofen 100 mg/5 mL oral suspension: 30 milliliter(s) orally every 6 hours  oxyCODONE 5 mg/5 mL oral solution: 5 milliliter(s) orally every 4 hours, As needed, Moderate Pain (4 - 6)  oxymetazoline 0.05% nasal spray:  nasal   sodium chloride 0.65% nasal spray:  nasal

## 2021-07-14 NOTE — DISCHARGE NOTE PROVIDER - HOSPITAL COURSE
7/14/2021- 25y female Lefort 1 Osteotomy and BSSO with bone graft post op day 1.  Pt recovering well. Pt ambulating, voiding, tolerating PO fluid intake. Pt reports pain is well controlled. Pt hemodynamically stable.     7/13/2021- 24 y/o female presents for scheduled maxillary lefort I osteotomy, bilateral sagittal split osteotomy with bone graft in OR.  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 No intraoperative complications.

## 2021-07-14 NOTE — DISCHARGE NOTE PROVIDER - CARE PROVIDER_API CALL
Miguel Ac (DDS)  OralMaxillofacial Surgery  [address] [facility]  [place], [place] [id number]  Phone: [phone]  Fax: [phone]  Follow Up Time:

## 2023-08-31 NOTE — H&P PST ADULT - PATIENT ON (OXYGEN DELIVERY METHOD)

## 2024-04-05 ENCOUNTER — TRANSCRIPTION ENCOUNTER (OUTPATIENT)
Age: 29
End: 2024-04-05